# Patient Record
Sex: FEMALE | Race: WHITE | NOT HISPANIC OR LATINO | Employment: FULL TIME | URBAN - NONMETROPOLITAN AREA
[De-identification: names, ages, dates, MRNs, and addresses within clinical notes are randomized per-mention and may not be internally consistent; named-entity substitution may affect disease eponyms.]

---

## 2024-05-26 ENCOUNTER — HOSPITAL ENCOUNTER (EMERGENCY)
Facility: HOSPITAL | Age: 34
Discharge: HOME | End: 2024-05-26
Attending: EMERGENCY MEDICINE | Admitting: EMERGENCY MEDICINE
Payer: COMMERCIAL

## 2024-05-26 VITALS
DIASTOLIC BLOOD PRESSURE: 92 MMHG | HEIGHT: 65 IN | SYSTOLIC BLOOD PRESSURE: 145 MMHG | HEART RATE: 100 BPM | WEIGHT: 220 LBS | RESPIRATION RATE: 14 BRPM | TEMPERATURE: 98.4 F | OXYGEN SATURATION: 100 % | BODY MASS INDEX: 36.65 KG/M2

## 2024-05-26 DIAGNOSIS — S61.012A LACERATION OF LEFT THUMB WITHOUT FOREIGN BODY WITHOUT DAMAGE TO NAIL, INITIAL ENCOUNTER: Primary | ICD-10-CM

## 2024-05-26 PROCEDURE — 25010000002 LIDOCAINE 1 % SOLUTION: Performed by: NURSE PRACTITIONER

## 2024-05-26 PROCEDURE — 99282 EMERGENCY DEPT VISIT SF MDM: CPT

## 2024-05-26 RX ORDER — LIDOCAINE HYDROCHLORIDE 10 MG/ML
10 INJECTION, SOLUTION INFILTRATION; PERINEURAL ONCE
Status: COMPLETED | OUTPATIENT
Start: 2024-05-26 | End: 2024-05-26

## 2024-05-26 RX ADMIN — LIDOCAINE HYDROCHLORIDE 10 ML: 10 INJECTION, SOLUTION INFILTRATION; PERINEURAL at 17:35

## 2024-05-26 NOTE — ED PROVIDER NOTES
Pt Name: Veronica Byrne  MRN: [id number]  : 1990  Date of Encounter: 2024    PCP: Provider, No Known      Subjective    History of Present Illness:    Chief Complaint: Left thumb laceration    History of Present Illness: Veronica Byrne is a 33 y.o. female who presents to the ER complaining of left thumb laceration.  Patient states she was getting up.'s when the knife slipped causing a laceration to the left thumb.  Patient states she held pressure wrapped it up and came to the emergency room for evaluation and repair.  Patient is up-to-date on her tetanus shot states it was a clean kitchen knife.        Nurses Notes reviewed and agree, including vitals, allergies, social history and prior medical history.       Allergies:    Bactrim [sulfamethoxazole-trimethoprim]    History reviewed. No pertinent past medical history.    History reviewed. No pertinent surgical history.    Social History     Socioeconomic History    Marital status: Single   Tobacco Use    Smoking status: Never    Smokeless tobacco: Never   Vaping Use    Vaping status: Never Used   Substance and Sexual Activity    Alcohol use: Yes     Comment: social    Drug use: Yes     Types: Marijuana     Comment: social    Sexual activity: Defer       History reviewed. No pertinent family history.    REVIEW OF SYSTEMS:     All systems reviewed and not pertinent unless noted.    Review of Systems   Skin:  Positive for wound.       Objective    Physical Exam  Vitals and nursing note reviewed.   Constitutional:       Appearance: Normal appearance.   HENT:      Head: Normocephalic and atraumatic.   Eyes:      Extraocular Movements: Extraocular movements intact.      Pupils: Pupils are equal, round, and reactive to light.   Cardiovascular:      Rate and Rhythm: Normal rate and regular rhythm.      Pulses: Normal pulses.      Heart sounds: Normal heart sounds.   Pulmonary:      Effort: Pulmonary effort is normal.      Breath sounds: Normal breath sounds.    Abdominal:      General: Abdomen is flat. Bowel sounds are normal.      Palpations: Abdomen is soft.   Musculoskeletal:      Cervical back: Normal range of motion and neck supple.   Skin:     Capillary Refill: Capillary refill takes less than 2 seconds.      Comments: Approximately 2 cm laceration to palmar aspect of left thumb   Neurological:      General: No focal deficit present.      Mental Status: She is alert and oriented to person, place, and time. Mental status is at baseline.      GCS: GCS eye subscore is 4. GCS verbal subscore is 5. GCS motor subscore is 6.      Sensory: Sensation is intact.      Motor: Motor function is intact.      Gait: Gait is intact.   Psychiatric:         Attention and Perception: Attention and perception normal.         Mood and Affect: Mood and affect normal.         Speech: Speech normal.         Behavior: Behavior normal. Behavior is cooperative.               Laceration Repair    Date/Time: 5/26/2024 6:33 PM    Performed by: Abdulaziz Daniel APRN  Authorized by: Milo Ward MD    Consent:     Consent obtained:  Verbal    Consent given by:  Patient    Risks discussed:  Infection, pain, need for additional repair, poor cosmetic result and nerve damage    Alternatives discussed:  No treatment  Universal protocol:     Procedure explained and questions answered to patient or proxy's satisfaction: yes      Relevant documents present and verified: yes      Site/side marked: yes      Immediately prior to procedure, a time out was called: yes      Patient identity confirmed:  Verbally with patient  Anesthesia:     Anesthesia method:  Local infiltration and nerve block    Local anesthetic:  Lidocaine 1% w/o epi    Block location:  Left thumb    Block injection procedure:  Anatomic landmarks identified, introduced needle, negative aspiration for blood and anatomic landmarks palpated    Block outcome:  Anesthesia achieved  Laceration details:     Location:  Finger    Finger  location:  L thumb    Length (cm):  2  Pre-procedure details:     Preparation:  Patient was prepped and draped in usual sterile fashion  Exploration:     Hemostasis achieved with:  Direct pressure  Treatment:     Area cleansed with:  Chlorhexidine    Amount of cleaning:  Standard  Skin repair:     Repair method:  Sutures and tissue adhesive    Suture size:  3-0    Suture material:  Prolene    Suture technique:  Simple interrupted    Number of sutures:  4  Approximation:     Approximation:  Close  Post-procedure details:     Dressing:  Non-adherent dressing    Procedure completion:  Tolerated      ED Course:         LAB Results:    Lab Results (last 24 hours)       ** No results found for the last 24 hours. **             If labs were ordered, I have independently reviewed the results and considered them in the diagnosis and treatment plan for the patient    RADIOLOGY    No radiology results from the last 24 hrs     If I have ordered, I have independently reviewed the above noted radiographic studies.  Please see the radiologist dictation for the official interpretation    Medications given to patient in the ER    Medications   lidocaine (XYLOCAINE) 1 % injection 10 mL (10 mL Injection Given 5/26/24 1735)                 Shared Decision Making: After my consideration the clinical presentation and laboratory/radiology studies obtained, I discussed the findings with the patient/patient representative who is in agreement with the treatment plan and final disposition. Risks and benefits of discharge and/or observation admission were discussed.  Final disposition of the patient will be discharged home and request patient follow-up with primary care provider or ER in 7 days for reevaluation and suture removal  Medications prescribed: No new medications were prescribed during this ER visit       Medical Decision Making  Veronica Byrne is a 33 y.o. female who presents to the ER complaining of left thumb laceration.  Patient  states she was getting up.'s when the knife slipped causing a laceration to the left thumb.  Patient states she held pressure wrapped it up and came to the emergency room for evaluation and repair.  Patient is up-to-date on her tetanus shot states it was a clean kitchen knife.    DDX: includes but is not limited to: Left thumb laceration    Problems Addressed:  Laceration of left thumb without foreign body without damage to nail, initial encounter: complicated acute illness or injury    Amount and/or Complexity of Data Reviewed  Discussion of management or test interpretation with external provider(s): Discussed assessment, treatment and plan with ER attending    Risk  Prescription drug management.  Risk Details: I have discussed with patient the finding of the test preformed today. Patient has been diagnosed with laceration of left thumb and will be discharged home.  Patient requested to follow-up with primary care provider or ER within the next 7 days for reevaluation and suture removal. Strict return precautions have been given and patient verbalizes understanding          Final diagnoses:   Laceration of left thumb without foreign body without damage to nail, initial encounter         Please note that portions of this document were completed using voice recognition dictation software.       Abdulaziz Daniel, APRN  05/26/24 [id number]

## 2024-06-02 ENCOUNTER — HOSPITAL ENCOUNTER (EMERGENCY)
Facility: HOSPITAL | Age: 34
Discharge: HOME | End: 2024-06-02
Attending: EMERGENCY MEDICINE | Admitting: EMERGENCY MEDICINE
Payer: COMMERCIAL

## 2024-06-02 VITALS
RESPIRATION RATE: 18 BRPM | SYSTOLIC BLOOD PRESSURE: 114 MMHG | HEIGHT: 65 IN | DIASTOLIC BLOOD PRESSURE: 84 MMHG | WEIGHT: 220.02 LBS | HEART RATE: 88 BPM | TEMPERATURE: 98.9 F | BODY MASS INDEX: 36.66 KG/M2 | OXYGEN SATURATION: 98 %

## 2024-06-02 DIAGNOSIS — L08.9 WOUND INFECTION: Primary | ICD-10-CM

## 2024-06-02 DIAGNOSIS — T14.8XXA WOUND INFECTION: Primary | ICD-10-CM

## 2024-06-02 PROCEDURE — 99283 EMERGENCY DEPT VISIT LOW MDM: CPT

## 2024-06-02 RX ORDER — CEPHALEXIN 250 MG/1
500 CAPSULE ORAL ONCE
Status: COMPLETED | OUTPATIENT
Start: 2024-06-02 | End: 2024-06-02

## 2024-06-02 RX ORDER — CEPHALEXIN 500 MG/1
500 CAPSULE ORAL 4 TIMES DAILY
Qty: 28 CAPSULE | Refills: 0 | Status: SHIPPED | OUTPATIENT
Start: 2024-06-02 | End: 2024-06-09

## 2024-06-02 RX ADMIN — CEPHALEXIN 500 MG: 250 CAPSULE ORAL at 19:52

## 2024-06-02 NOTE — ED PROVIDER NOTES
"Subjective  History of Present Illness:    This is a 33-year-old female presented for evaluation of wound check.  Patient has a wound to the left thumb, she recently lacerated her finger, had sutures that were in place as well as glue, she recently remove the sutures herself, and when she did, noticed that there was a small amount of pus and was concern for wound infection.  She does not note any fevers.  Mildly tachycardic on arrival.  Afebrile on arrival.  Other complaints, full range of motion, denies erythema of the thumb.  Patient was seen on 5-26 for laceration of the left thumb without foreign body without damage to the nail.  Is noted to have a 2 cm laceration.  Appears 4 sutures were placed.  3 have been removed, there is still 1 suture in place underlying the glue that was also applied.      Nurses Notes reviewed and agree, including vitals, allergies, social history and prior medical history.     REVIEW OF SYSTEMS: All systems reviewed and not pertinent unless noted.  Review of Systems   Constitutional:  Negative for fever.   Skin:  Positive for wound.   All other systems reviewed and are negative.      History reviewed. No pertinent past medical history.    Allergies:    Bactrim [sulfamethoxazole-trimethoprim]      History reviewed. No pertinent surgical history.      Social History     Socioeconomic History    Marital status: Single   Tobacco Use    Smoking status: Never    Smokeless tobacco: Never   Vaping Use    Vaping status: Never Used   Substance and Sexual Activity    Alcohol use: Yes     Comment: social    Drug use: Yes     Types: Marijuana     Comment: social    Sexual activity: Defer         History reviewed. No pertinent family history.    Objective  Physical Exam:  /90 (BP Location: Left arm, Patient Position: Sitting)   Pulse 105   Temp 99.1 °F (37.3 °C) (Oral)   Resp 18   Ht 165.1 cm (65\")   Wt 99.8 kg (220 lb 0.3 oz)   LMP 05/12/2024 (Approximate)   SpO2 99%   BMI 36.61 kg/m² "      Physical Exam  Vitals and nursing note reviewed.   Constitutional:       General: She is not in acute distress.     Appearance: Normal appearance. She is normal weight. She is not ill-appearing, toxic-appearing or diaphoretic.   HENT:      Head: Normocephalic and atraumatic.      Nose: Nose normal.      Mouth/Throat:      Pharynx: Oropharynx is clear.   Eyes:      Extraocular Movements: Extraocular movements intact.   Cardiovascular:      Pulses: Normal pulses.      Comments: Appears well-perfused  Pulmonary:      Effort: Pulmonary effort is normal. No respiratory distress.   Abdominal:      General: Abdomen is flat.   Musculoskeletal:         General: No swelling or tenderness. Normal range of motion.      Cervical back: Normal range of motion.   Skin:     General: Skin is warm and dry.      Capillary Refill: Capillary refill takes less than 2 seconds.      Findings: Erythema present.   Neurological:      General: No focal deficit present.      Mental Status: She is alert and oriented to person, place, and time.   Psychiatric:         Mood and Affect: Mood normal.         Behavior: Behavior normal.         Thought Content: Thought content normal.         Judgment: Judgment normal.               Suture Removal    Date/Time: 6/2/2024 8:14 PM    Performed by: Sulaiman Art PA-C  Authorized by: Milo Ward MD    Consent:     Consent obtained:  Verbal    Consent given by:  Patient    Risks, benefits, and alternatives were discussed: yes      Risks discussed:  Bleeding, pain and wound separation    Alternatives discussed:  No treatment  Universal protocol:     Procedure explained and questions answered to patient or proxy's satisfaction: yes      Patient identity confirmed:  Verbally with patient  Location:     Location:  Upper extremity    Upper extremity location:  Hand    Hand location:  L thumb  Procedure details:     Wound appearance:  Red, nonpurulent and tender    Number of sutures removed:   1  Post-procedure details:     Post-removal:  Dressing applied    Procedure completion:  Tolerated well, no immediate complications      ED Course:         Lab Results (last 24 hours)       ** No results found for the last 24 hours. **             No radiology results from the last 24 hrs       MDM      Initial impression of presenting illness: Is a 33-year-old female presented for evaluation of wound check    DDX: includes but is not limited to: Wound infection, cellulitis, abscess, others    Patient arrives hemodynamically stable afebrile mildly tachycardic at a rate of 105 nonhypoxic and nontoxic-appearing with vitals interpreted by myself.     Pertinent features from physical exam: Patient is noted to have small area of erythema to area of left thumb laceration, there is no pus or abscess or fluctuance felt, full range of motion, Kanavels signs are absent and have low suspicion for flexor tenosynovitis.  There is still 1 suture underlying chemical adhesive glue.  Findings concerning for minor wound infection..    Initial diagnostic plan: At this time no labs or imaging are indicated.    Results from initial plan were reviewed and interpreted by me revealing n/a.     Diagnostic information from other sources: Record reviewed    Interventions / Re-evaluation: Keflex 500mg.  Wound was soaked in water, glue was removed and last suture was also removed.  Wrapped with nonocclusive dressing.  Stable for discharge    Results/clinical rationale were discussed with patient at bedside.  Will treat with Keflex and mupirocin ointment and have her follow-up with primary care.  See suture removal note above, last suture was removed after soaking with hydrogen peroxide and water.  Alcohol pad was used for removal chemical adhesive, suture was cut with scissors and fully removed.  There is still minor seperation of the wound with mild erythema.  No purulent drainage noted.  Return precautions given.    Consultations/Discussion  of results with other physicians: N/A    Disposition plan: Discharge.  Will put patient on Keflex 4 times daily for 7 days and have her follow-up with primary care and return for any worsening symptoms.  Discussed wound care with the patient.  Patient agreeable to plan at bedside and discharged in appropriate condition  -----    Final diagnoses:   Wound infection          Sulaiman Art PA-C  06/02/24 2018

## 2024-06-02 NOTE — Clinical Note
[facility] EMERGENCY DEPARTMENT  [address] [address]  Phone: [phone]    Veronica Byrne was seen and treated in our emergency department on 6/2/2024.  She may return to work on 06/03/2024.         Thank you for choosing [facility].    Sulaiman Art PA-C

## 2024-06-03 NOTE — DISCHARGE INSTRUCTIONS
Follow-up with your primary care physician.  I started you on antibiotics, take as directed.  Keep clean and covered, wash with gentle soap and water.  If your symptoms do not improve within the neck several days, please return for reevaluation.  Recommend dressing changes every 24 hours.